# Patient Record
Sex: FEMALE | Race: WHITE | NOT HISPANIC OR LATINO | URBAN - METROPOLITAN AREA
[De-identification: names, ages, dates, MRNs, and addresses within clinical notes are randomized per-mention and may not be internally consistent; named-entity substitution may affect disease eponyms.]

---
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
## 2021-07-10 ENCOUNTER — EMERGENCY (EMERGENCY)
Facility: HOSPITAL | Age: 21
LOS: 1 days | Discharge: HOME | End: 2021-07-10
Attending: EMERGENCY MEDICINE | Admitting: EMERGENCY MEDICINE
Payer: MEDICAID

## 2021-07-10 VITALS
DIASTOLIC BLOOD PRESSURE: 65 MMHG | SYSTOLIC BLOOD PRESSURE: 102 MMHG | HEART RATE: 85 BPM | RESPIRATION RATE: 18 BRPM | OXYGEN SATURATION: 100 %

## 2021-07-10 VITALS
DIASTOLIC BLOOD PRESSURE: 64 MMHG | HEART RATE: 103 BPM | TEMPERATURE: 100 F | SYSTOLIC BLOOD PRESSURE: 108 MMHG | RESPIRATION RATE: 18 BRPM | OXYGEN SATURATION: 98 %

## 2021-07-10 LAB
B PERT DNA SPEC QL NAA+PROBE: SIGNIFICANT CHANGE UP
C PNEUM DNA SPEC QL NAA+PROBE: SIGNIFICANT CHANGE UP
FLUAV SUBTYP SPEC NAA+PROBE: SIGNIFICANT CHANGE UP
FLUBV RNA SPEC QL NAA+PROBE: SIGNIFICANT CHANGE UP
HADV DNA SPEC QL NAA+PROBE: SIGNIFICANT CHANGE UP
HCOV 229E RNA SPEC QL NAA+PROBE: SIGNIFICANT CHANGE UP
HCOV HKU1 RNA SPEC QL NAA+PROBE: SIGNIFICANT CHANGE UP
HCOV NL63 RNA SPEC QL NAA+PROBE: SIGNIFICANT CHANGE UP
HCOV OC43 RNA SPEC QL NAA+PROBE: SIGNIFICANT CHANGE UP
HMPV RNA SPEC QL NAA+PROBE: SIGNIFICANT CHANGE UP
HPIV1 RNA SPEC QL NAA+PROBE: SIGNIFICANT CHANGE UP
HPIV2 RNA SPEC QL NAA+PROBE: SIGNIFICANT CHANGE UP
HPIV3 RNA SPEC QL NAA+PROBE: SIGNIFICANT CHANGE UP
HPIV4 RNA SPEC QL NAA+PROBE: SIGNIFICANT CHANGE UP
RAPID RVP RESULT: SIGNIFICANT CHANGE UP
RSV RNA SPEC QL NAA+PROBE: SIGNIFICANT CHANGE UP
RV+EV RNA SPEC QL NAA+PROBE: SIGNIFICANT CHANGE UP
SARS-COV-2 RNA SPEC QL NAA+PROBE: SIGNIFICANT CHANGE UP

## 2021-07-10 PROCEDURE — 99284 EMERGENCY DEPT VISIT MOD MDM: CPT

## 2021-07-10 RX ORDER — AMOXICILLIN 250 MG/5ML
1 SUSPENSION, RECONSTITUTED, ORAL (ML) ORAL
Qty: 20 | Refills: 0
Start: 2021-07-10 | End: 2021-07-19

## 2021-07-10 RX ORDER — AMOXICILLIN 250 MG/5ML
500 SUSPENSION, RECONSTITUTED, ORAL (ML) ORAL ONCE
Refills: 0 | Status: COMPLETED | OUTPATIENT
Start: 2021-07-10 | End: 2021-07-10

## 2021-07-10 RX ORDER — DEXAMETHASONE 0.5 MG/5ML
6 ELIXIR ORAL ONCE
Refills: 0 | Status: COMPLETED | OUTPATIENT
Start: 2021-07-10 | End: 2021-07-10

## 2021-07-10 RX ADMIN — Medication 500 MILLIGRAM(S): at 09:13

## 2021-07-10 RX ADMIN — Medication 6 MILLIGRAM(S): at 08:42

## 2021-07-10 NOTE — ED PROVIDER NOTE - PATIENT PORTAL LINK FT
You can access the FollowMyHealth Patient Portal offered by [facility] by registering at the following website: [email or web address]. By joining [facility]’s FollowMyHealth portal, you will also be able to view your health information using other applications (apps) compatible with our system.

## 2021-07-10 NOTE — ED PROVIDER NOTE - CARE PLAN
Principal Discharge DX:	Strep pharyngitis  Secondary Diagnosis:	Sore throat  Secondary Diagnosis:	Viral syndrome

## 2021-07-10 NOTE — ED PROVIDER NOTE - NS ED ROS FT
Constitutional: (+) fever (-) vomiting  Eyes/ENT: (-) vision changes  Cardiovascular: (-) chest pain, (-) wheezing  Respiratory: (-) cough, (-) shortness of breath  Gastrointestinal: (-) vomiting, (-) diarrhea, (-) abdominal pain  : (-) dysuria   Musculoskeletal: (-) back pain  Integumentary: (-) rash, (-) edema  Neurological: (-)loc  Allergic/Immunologic: (-) pruritus  Endocrine: No history of thyroid disease

## 2021-07-10 NOTE — ED ADULT TRIAGE NOTE - CHIEF COMPLAINT QUOTE
pt arrives w/ c/o fever and throat pain x 1 week. pt states went to urgent care but was referred to ER. pt denies and abdominal pain, nausea, vomiting. pt states temp at home was 102 and took 650mg of Tylenol @ 5 am. lmp 07/05    addend: as per mother pt has been having headaches and her other daughter had an aneurysm and states was told it's genetic.

## 2021-07-10 NOTE — ED PROVIDER NOTE - OBJECTIVE STATEMENT
19 y/o F no pmh, fam hx "brain anyuerism" per mother, here with sore throat x1 week, now with fever today. Also c/o neck lymph nodes and mild R sided HA and sinus pressure. Clear rhinorrhea. No sick contacts. No cough. Had rapid strep and mono test done at  and were both reported to be negative. No similar previous episodes. Able to tolerate PO with difficulty due to pain. Took tylenol this AM with relief of fever, some relief of pain. Denies cp, sob, n/v/d, blurry vision, LOC. Does endorse some mild abdominal pain in LUQ, non radiating, "sore". 19 y/o F no pmh, fam hx "brain anyuerism" per mother, here with sore throat x1 week, now with fever today. Also c/o neck lymph nodes and mild R sided HA and sinus pressure. Clear rhinorrhea. No sick contacts. No cough. Had rapid strep and mono test done at  and were both reported to be negative. No similar previous episodes. Able to tolerate PO with difficulty due to pain. Took tylenol this AM with relief of fever, some relief of pain. Denies cp, sob, n/v/d, blurry vision, LOC. Does endorse some mild abdominal pain in LUQ, non radiating, "sore".    Attendinyo female presents with sore throat and fever.  no cough.  no trouble swallowing.  no vomiting.

## 2021-07-10 NOTE — ED PROVIDER NOTE - PHYSICAL EXAMINATION
Vitals: I have reviewed the patients vital signs. mild Tachy in triage, resolved in room  General: well appearing, well nourished, no acute distress  HEENT: atraumatic, normocephalic, airway patent, L>R posterior exudate, tonsilar swelling without PTA. Tolerating PO, normal phonation, no angioedema, no tenderness to roof of mouth, no trismus, no maxillary or frontal sinus tenderness.   Neck: + bilateral tender LAD  Chest/Lungs: no trauma, symmetric chest rise, lung sounds clear bilaterally, speaking in complete sentences  Heart: skin well perfused  Abdomen: Soft and nontender throughout, no rebound or guarding  Neuro: A+Ox3, CNII-XII intact, ambulating without difficulty, non dysarthric speech  Eyes: PERRL, EOMI, no conjunctival injection  MSK: all limbs at baseline strength, no wasting or atrophy, no peripheral edema  Skin: no bleeding, no cyanosis, no jaundice, no new emergent lesions

## 2021-07-10 NOTE — ED PROVIDER NOTE - CLINICAL SUMMARY MEDICAL DECISION MAKING FREE TEXT BOX
Otherwise healthy 21 y/o 1 week of sore throat, now with fever, frontal neck pain, sinus pressure, rhinorrhea. Negative rapid strep and mono in office. Tolerating PO. Fever resolved with tylenol. Non toxic appearing. Airway patent, L>R tonsilar exudate and swelling without PTA. No trismus, no neck stiffness, meningismus. No visual changes, CN intact. Lungs CTAB, abd SNT. CENTOR score of 4, will treat empirically for strep. Will give steroids for symptomatic relief, dc on amoxicillin. If not strep, likely viral syndrome, possible covid. Motrin/tylenol for symptomatic relief.

## 2021-07-10 NOTE — ED ADULT NURSE NOTE - NSIMPLEMENTINTERV_GEN_ALL_ED
Implemented All Universal Safety Interventions:  [place] to call system. Call bell, personal items and telephone within reach. Instruct patient to call for assistance. Room bathroom lighting operational. Non-slip footwear when patient is off stretcher. Physically safe environment: no spills, clutter or unnecessary equipment. Stretcher in lowest position, wheels locked, appropriate side rails in place.

## 2021-07-10 NOTE — ED ADULT NURSE NOTE - OBJECTIVE STATEMENT
Received patient to room 4, A&OX4, ambulatory. Pt. c/o throat pain and right-sided HA x1 week. . Pt. states she came to the ED because she spiked a fever at home of 102. Pt. states she took Tylenol at 5 AM. Denies n/v, chest pain, SOB, urinary symptoms. Respirations appear even and unlabored. COVID swab sent. Comfort measures in place. Will continue to monitor.

## 2021-07-10 NOTE — ED PROVIDER NOTE - NSFOLLOWUPINSTRUCTIONS_ED_ALL_ED_FT
You came to the ER with a sore throat. You came to the ER with a sore throat. We evaluated you and believe this is either from Strep Throat or a virus. We believe treating you with a course of AMOXICILLIN is the best choice going forward, and your prescription has been sent to your pharmacy.     You can also take motrin (up to 1,800mg a day in 3 doses) and tylenol (up to 3,000mg a day in 3 doses) for your symptoms.    Please return to the ER if you are unable to eat or drink, cannot swallow your saliva, cannot move your neck or open your mouth, pass out, have difficulty breathing, or for any concern you would like evaluated. You came to the ER with a sore throat. We evaluated you and believe this is either from Strep Throat or a virus. We believe treating you with a course of AMOXICILLIN is the best choice going forward, and your prescription has been sent to your pharmacy. We also gave you a steroid DECADRON here in the ED to reduce swelling.    The results of your covid test are still pending. Please make an account with the patient portal, or contact the number below regarding your results later today, or early tomorrow.     You can also take motrin (up to 1,800mg a day in 3 doses) and tylenol (up to 3,000mg a day in 3 doses) for your symptoms.    Please return to the ER if you are unable to eat or drink, cannot swallow your saliva, cannot move your neck or open your mouth, pass out, have difficulty breathing, or for any concern you would like evaluated.

## 2025-05-07 ENCOUNTER — APPOINTMENT (OUTPATIENT)
Dept: OBGYN | Facility: CLINIC | Age: 25
End: 2025-05-07